# Patient Record
Sex: FEMALE | Race: BLACK OR AFRICAN AMERICAN
[De-identification: names, ages, dates, MRNs, and addresses within clinical notes are randomized per-mention and may not be internally consistent; named-entity substitution may affect disease eponyms.]

---

## 2020-11-11 ENCOUNTER — HOSPITAL ENCOUNTER (EMERGENCY)
Dept: HOSPITAL 92 - ERS | Age: 50
Discharge: HOME | End: 2020-11-11
Payer: COMMERCIAL

## 2020-11-11 DIAGNOSIS — J18.9: Primary | ICD-10-CM

## 2020-11-11 LAB
ALBUMIN SERPL BCG-MCNC: 4 G/DL (ref 3.5–5)
ALP SERPL-CCNC: 125 U/L (ref 40–110)
ALT SERPL W P-5'-P-CCNC: 63 U/L (ref 8–55)
ANION GAP SERPL CALC-SCNC: 11 MMOL/L (ref 10–20)
AST SERPL-CCNC: 58 U/L (ref 5–34)
BASOPHILS # BLD AUTO: 0.1 THOU/UL (ref 0–0.2)
BASOPHILS NFR BLD AUTO: 1.8 % (ref 0–1)
BILIRUB SERPL-MCNC: 0.2 MG/DL (ref 0.2–1.2)
BUN SERPL-MCNC: 18 MG/DL (ref 9.8–20.1)
CALCIUM SERPL-MCNC: 9.6 MG/DL (ref 7.8–10.44)
CHLORIDE SERPL-SCNC: 105 MMOL/L (ref 98–107)
CO2 SERPL-SCNC: 29 MMOL/L (ref 22–29)
CREAT CL PREDICTED SERPL C-G-VRATE: 0 ML/MIN (ref 70–130)
EOSINOPHIL # BLD AUTO: 0.1 THOU/UL (ref 0–0.7)
EOSINOPHIL NFR BLD AUTO: 2.3 % (ref 0–10)
GLOBULIN SER CALC-MCNC: 3.7 G/DL (ref 2.4–3.5)
GLUCOSE SERPL-MCNC: 101 MG/DL (ref 70–105)
HGB BLD-MCNC: 12.8 G/DL (ref 12–16)
LIPASE SERPL-CCNC: 62 U/L (ref 8–78)
LYMPHOCYTES # BLD: 2.7 THOU/UL (ref 1.2–3.4)
LYMPHOCYTES NFR BLD AUTO: 48 % (ref 21–51)
MCH RBC QN AUTO: 29.2 PG (ref 27–31)
MCV RBC AUTO: 92.2 FL (ref 78–98)
MONOCYTES # BLD AUTO: 0.5 THOU/UL (ref 0.11–0.59)
MONOCYTES NFR BLD AUTO: 8.3 % (ref 0–10)
NEUTROPHILS # BLD AUTO: 2.3 THOU/UL (ref 1.4–6.5)
NEUTROPHILS NFR BLD AUTO: 39.5 % (ref 42–75)
PLATELET # BLD AUTO: 315 THOU/UL (ref 130–400)
POTASSIUM SERPL-SCNC: 3.9 MMOL/L (ref 3.5–5.1)
RBC # BLD AUTO: 4.36 MILL/UL (ref 4.2–5.4)
SODIUM SERPL-SCNC: 141 MMOL/L (ref 136–145)
SP GR UR STRIP: 1.02 (ref 1–1.04)
WBC # BLD AUTO: 5.7 THOU/UL (ref 4.8–10.8)

## 2020-11-11 PROCEDURE — 71045 X-RAY EXAM CHEST 1 VIEW: CPT

## 2020-11-11 PROCEDURE — 84484 ASSAY OF TROPONIN QUANT: CPT

## 2020-11-11 PROCEDURE — 85379 FIBRIN DEGRADATION QUANT: CPT

## 2020-11-11 PROCEDURE — 93005 ELECTROCARDIOGRAM TRACING: CPT

## 2020-11-11 PROCEDURE — 80053 COMPREHEN METABOLIC PANEL: CPT

## 2020-11-11 PROCEDURE — 83690 ASSAY OF LIPASE: CPT

## 2020-11-11 PROCEDURE — 81003 URINALYSIS AUTO W/O SCOPE: CPT

## 2020-11-11 PROCEDURE — 85025 COMPLETE CBC W/AUTO DIFF WBC: CPT

## 2020-11-11 PROCEDURE — 36415 COLL VENOUS BLD VENIPUNCTURE: CPT

## 2020-11-11 NOTE — RAD
Chest one view



HISTORY: Chest pain.



FINDINGS: Cardiac silhouette is magnified by projection. Pulmonary vasculature is unremarkable.



Mediastinum is midline. Subtle ill-defined opacity projects over the left lateral costophrenic angle 
with the appearance of the basilar infiltrate.



No lobar consolidation or evidence of pneumothorax.



  



IMPRESSION :

Minimal left posterior basilar infiltrate. Clinical correlation regarding other signs and symptoms of
 left lower lobe pneumonitis is required.



Reported By: KAYDEN Pollock 

Electronically Signed:  11/11/2020 8:15 PM

## 2020-12-16 ENCOUNTER — HOSPITAL ENCOUNTER (OUTPATIENT)
Dept: HOSPITAL 92 - BICMAMMO | Age: 50
Discharge: HOME | End: 2020-12-16
Attending: STUDENT IN AN ORGANIZED HEALTH CARE EDUCATION/TRAINING PROGRAM
Payer: COMMERCIAL

## 2020-12-16 DIAGNOSIS — Z12.31: Primary | ICD-10-CM

## 2020-12-16 PROCEDURE — 77067 SCR MAMMO BI INCL CAD: CPT

## 2020-12-16 PROCEDURE — 77063 BREAST TOMOSYNTHESIS BI: CPT

## 2021-01-04 NOTE — MMO
Bilateral MAMMO Bilat Screen [facility].

 

CLINICAL HISTORY:

Patient is 50 years old and is seen for screening. The patient has no family

history of breast cancer.  The patient has no personal history of cancer.

 

VIEWS:

The views performed were:  bilateral craniocaudal with tomosynthesis and

bilateral mediolateral oblique with tomosynthesis.

 

FILMS COMPARED:

The present examination has been compared to a prior imaging study performed at

[facility] on 02/21/2014.

 

This study has been interpreted with the assistance of computer-aided detection.

 

MAMMOGRAM FINDINGS:

The breasts are heterogeneously dense, which could obscure a lesion on

mammography.

 

There are no suspicious masses, suspicious calcifications, or new areas of

architectural distortion.

 

IMPRESSION:

THERE IS NO MAMMOGRAPHIC EVIDENCE OF MALIGNANCY.

 

A ROUTINE FOLLOW-UP MAMMOGRAM IN 1 YEAR IS RECOMMENDED.

 

THE RESULTS OF THIS EXAM WERE SENT TO THE PATIENT.

 

ACR BI-RADS Category 1 - Negative

 

MAMMOGRAPHY NOTE:

 1. A negative mammogram report should not delay a biopsy if a dominant of

 clinically suspicious mass is present.

 2. Approximately 10% to 15% of breast cancers are not detected by

 mammography.

 3. Adenosis and dense breasts may obscure an underlying neoplasm.

 

 

Reported by: MARRY TOLLIVER MD

Electonically Signed: [id number]

## 2021-10-13 ENCOUNTER — HOSPITAL ENCOUNTER (OUTPATIENT)
Dept: HOSPITAL 92 - NM | Age: 51
Discharge: HOME | End: 2021-10-13
Attending: INTERNAL MEDICINE
Payer: COMMERCIAL

## 2021-10-13 DIAGNOSIS — E04.1: ICD-10-CM

## 2021-10-13 DIAGNOSIS — E05.90: Primary | ICD-10-CM

## 2021-10-13 PROCEDURE — A9516 IODINE I-123 SOD IODIDE MIC: HCPCS

## 2021-10-13 PROCEDURE — 78014 THYROID IMAGING W/BLOOD FLOW: CPT

## 2021-12-02 ENCOUNTER — HOSPITAL ENCOUNTER (OUTPATIENT)
Dept: HOSPITAL 92 - CSHLAB | Age: 51
Discharge: HOME | End: 2021-12-02
Attending: OTOLARYNGOLOGY
Payer: COMMERCIAL

## 2021-12-02 DIAGNOSIS — E04.9: ICD-10-CM

## 2021-12-02 DIAGNOSIS — Z01.818: Primary | ICD-10-CM

## 2021-12-02 DIAGNOSIS — Z20.822: ICD-10-CM

## 2021-12-02 LAB
HGB BLD-MCNC: 12.4 G/DL (ref 12–15.5)
MCH RBC QN AUTO: 28.2 PG (ref 27–33)
MCV RBC AUTO: 91.8 FL (ref 81.6–98.3)
PLATELET # BLD AUTO: 335 10X3/UL (ref 150–450)
RBC # BLD AUTO: 4.39 10X6/UL (ref 3.9–5.03)
WBC # BLD AUTO: 5.1 10X3/UL (ref 3.5–10.5)

## 2021-12-02 PROCEDURE — U0005 INFEC AGEN DETEC AMPLI PROBE: HCPCS

## 2021-12-02 PROCEDURE — 93005 ELECTROCARDIOGRAM TRACING: CPT

## 2021-12-02 PROCEDURE — 93010 ELECTROCARDIOGRAM REPORT: CPT

## 2021-12-02 PROCEDURE — 85027 COMPLETE CBC AUTOMATED: CPT

## 2021-12-02 PROCEDURE — U0003 INFECTIOUS AGENT DETECTION BY NUCLEIC ACID (DNA OR RNA); SEVERE ACUTE RESPIRATORY SYNDROME CORONAVIRUS 2 (SARS-COV-2) (CORONAVIRUS DISEASE [COVID-19]), AMPLIFIED PROBE TECHNIQUE, MAKING USE OF HIGH THROUGHPUT TECHNOLOGIES AS DESCRIBED BY CMS-2020-01-R: HCPCS

## 2021-12-07 ENCOUNTER — HOSPITAL ENCOUNTER (OUTPATIENT)
Dept: HOSPITAL 92 - CSHSDC | Age: 51
Setting detail: OBSERVATION
LOS: 1 days | Discharge: HOME | End: 2021-12-08
Attending: OTOLARYNGOLOGY | Admitting: OTOLARYNGOLOGY
Payer: COMMERCIAL

## 2021-12-07 VITALS — BODY MASS INDEX: 31.8 KG/M2

## 2021-12-07 DIAGNOSIS — E04.1: Primary | ICD-10-CM

## 2021-12-07 DIAGNOSIS — E05.90: ICD-10-CM

## 2021-12-07 PROCEDURE — 82310 ASSAY OF CALCIUM: CPT

## 2021-12-07 PROCEDURE — 0GTK0ZZ RESECTION OF THYROID GLAND, OPEN APPROACH: ICD-10-PCS | Performed by: OTOLARYNGOLOGY

## 2021-12-07 PROCEDURE — 0GBJ0ZZ EXCISION OF THYROID GLAND ISTHMUS, OPEN APPROACH: ICD-10-PCS | Performed by: OTOLARYNGOLOGY

## 2021-12-07 PROCEDURE — 83970 ASSAY OF PARATHORMONE: CPT

## 2021-12-07 PROCEDURE — G0378 HOSPITAL OBSERVATION PER HR: HCPCS

## 2021-12-07 PROCEDURE — 88307 TISSUE EXAM BY PATHOLOGIST: CPT

## 2021-12-07 RX ADMIN — CALCIUM CARBONATE SCH MG: 500 TABLET, CHEWABLE ORAL at 15:23

## 2021-12-07 RX ADMIN — HYDROCODONE BITARTRATE AND ACETAMINOPHEN PRN TAB: 5; 325 TABLET ORAL at 19:46

## 2021-12-07 RX ADMIN — HYDROCODONE BITARTRATE AND ACETAMINOPHEN PRN TAB: 5; 325 TABLET ORAL at 15:23

## 2021-12-07 RX ADMIN — HYDROCODONE BITARTRATE AND ACETAMINOPHEN PRN TAB: 5; 325 TABLET ORAL at 10:58

## 2021-12-07 RX ADMIN — CALCIUM CARBONATE SCH MG: 500 TABLET, CHEWABLE ORAL at 19:47

## 2021-12-08 VITALS — TEMPERATURE: 97.7 F | SYSTOLIC BLOOD PRESSURE: 144 MMHG | DIASTOLIC BLOOD PRESSURE: 67 MMHG

## 2021-12-08 RX ADMIN — HYDROCODONE BITARTRATE AND ACETAMINOPHEN PRN TAB: 5; 325 TABLET ORAL at 03:14

## 2021-12-08 RX ADMIN — CALCIUM CARBONATE SCH MG: 500 TABLET, CHEWABLE ORAL at 08:16

## 2021-12-08 RX ADMIN — HYDROCODONE BITARTRATE AND ACETAMINOPHEN PRN TAB: 5; 325 TABLET ORAL at 00:00
